# Patient Record
Sex: FEMALE | Race: BLACK OR AFRICAN AMERICAN | Employment: FULL TIME | URBAN - METROPOLITAN AREA
[De-identification: names, ages, dates, MRNs, and addresses within clinical notes are randomized per-mention and may not be internally consistent; named-entity substitution may affect disease eponyms.]

---

## 2019-11-19 ENCOUNTER — OFFICE VISIT (OUTPATIENT)
Dept: ORTHOPEDIC SURGERY | Age: 39
End: 2019-11-19

## 2019-11-19 VITALS
WEIGHT: 293 LBS | SYSTOLIC BLOOD PRESSURE: 154 MMHG | OXYGEN SATURATION: 94 % | RESPIRATION RATE: 10 BRPM | HEIGHT: 61 IN | HEART RATE: 79 BPM | BODY MASS INDEX: 55.32 KG/M2 | TEMPERATURE: 96.4 F | DIASTOLIC BLOOD PRESSURE: 101 MMHG

## 2019-11-19 DIAGNOSIS — S92.124A CLOSED NONDISPLACED FRACTURE OF BODY OF RIGHT TALUS, INITIAL ENCOUNTER: ICD-10-CM

## 2019-11-19 DIAGNOSIS — S93.421A SPRAIN OF DELTOID LIGAMENT OF RIGHT ANKLE, INITIAL ENCOUNTER: Primary | ICD-10-CM

## 2019-11-19 DIAGNOSIS — M25.572 CHRONIC PAIN OF LEFT ANKLE: ICD-10-CM

## 2019-11-19 DIAGNOSIS — M72.2 PLANTAR FASCIITIS, BILATERAL: ICD-10-CM

## 2019-11-19 DIAGNOSIS — M25.571 ACUTE RIGHT ANKLE PAIN: ICD-10-CM

## 2019-11-19 DIAGNOSIS — G89.29 CHRONIC PAIN OF LEFT ANKLE: ICD-10-CM

## 2019-11-19 NOTE — LETTER
NOTIFICATION RETURN TO WORK / SCHOOL 
 
11/19/2019 9:09 AM 
 
Ms. Joanna Macario [address] [facility] [id number] To Whom It May Concern: 
 
Joanna Macario is currently under the care of [address] [place] [address] Enrrique Gutierrez. Please allow Ms. Donato Campbell to work a sedentary position until further notice. If there are questions or concerns please have the patient contact our office.  
 
 
 
Sincerely, 
 
 
Sandra Cruz MD

## 2019-11-19 NOTE — PROGRESS NOTES
1. Have you been to the ER, urgent care clinic since your last visit? Hospitalized since your last visit? No    2. Have you seen or consulted any other health care providers outside of the [address] [place] [address] since your last visit? Include any pap smears or colon screening.  No

## 2019-11-19 NOTE — PROGRESS NOTES
AMBULATORY PROGRESS NOTE      Patient: Padmini Leblanc             MRN: [id number]     SSN: [id number] Body mass index is 62.13 kg/m². YOB: 1980     AGE: 37 y.o. SEX: female    PCP: Duncan Patel MD     IMPRESSION/DIAGNOSIS AND TREATMENT PLAN     DIAGNOSES  1. Sprain of deltoid ligament of right ankle, initial encounter    2. Plantar fasciitis, bilateral    3. Acute right ankle pain    4. Chronic pain of left ankle    5. Closed nondisplaced fracture of body of right talus, initial encounter        Orders Placed This Encounter    AMB SUPPLY ORDER    [54874] Ankle Min 3V    [19427] Ankle Min 3V    CT ANKLE RT WO  CONT    REFERRAL TO PHYSICAL THERAPY    AL CLOSED RX TALUS FX      Padmini Leblanc understands her diagnoses and the proposed plan. Plan:    1) DME Order: Short right CAM walker boot. 2) CT scan without IV Contrast of the right ankle. 3) Referral to Physical Therapy. 4) Work Note: Please allow Ms. Ester Call to work a sedentary position until further notice. 5) Take OTC Ibuprofen or Tylenol. 6) Continue activity modification as directed. RTO - after CT    Her x-rays, three views of the left ankle, show that there are some mild osteoarthritic changes seen to the left ankle. An incidental bone spur is seen to the superior portion of the calcaneus and inferior portion of the calcaneus. Otherwise, I see no acute fracture, subluxation, or dislocation in these three views of the left ankle. Three views of the right ankle, AP, lateral, and oblique, show some mild osteoarthritic changes seen to the right ankle, possible early osteochondral defect seen to the medial talar dome. There is a lucency, linear line seen to the medial portion of the talus suggestive of a vertical shear, non-displaced fracture of the posterior process or medial process of this right talus in the AP view in my ankle film.   Otherwise, the lateral film on the right shows some mild osteoarthritic changes seen to the right ankle. No acute fracture to the calcaneus or the navicular. The fibula on the right appears to be of equal height to that compared to that on the left in terms of the length of the fibula, but I am concerned, as I said before, a possible vertical shear fracture to the medial portion of the talus, and this is where she has some tenderness to the medial deltoid region. So, a CT scan of this right ankle is necessary to reassess and define this area in more detail. HPI AND EXAMINATION     Imani Puente IS A 37 y.o. female who presents to my outpatient office complaining of right ankle pain and left foot pain. Ms. Tyrell Hernández reports that she has been experiencing pain in her left plantar foot for the past year. She notes that her left foot is very tender to the touch and sore. She has been to the [facility] where she was given orthotic inserts and has tried several different shoes, none of which have helped with her pain. She adds that she has also tried rolling her foot with a frozen water bottle, which has not helped. She adds that she also experiences similar pain in her right foot, but to a lesser extent. As it regards to her right ankle, Ms. Tyrell Hernández reports that she has been favoring her right foot due to her left plantar fascial pain. As such, this past Saturday, she fell down 6 steps when coming out of her house, causing her to injure her right ankle. She was seen at [facility] the next day where she was given an ankle brace. She reports that she has been experiencing pain to her medial and lateral right ankle since her injury. She describes the pain as sharp when she bears weight and throbbing when she is seated. Date of injury: 11/14/2019.      Visit Vitals  BP (!) 154/101   Pulse 79   Temp 96.4 °F (35.8 °C) (Oral)   Resp 10   Ht 5' 1\" (1.549 m)   Wt 328 lb 12.8 oz (149.1 kg)   SpO2 94%   BMI 62.13 kg/m²     Appearance: Alert, well appearing and pleasant patient who is in no distress, oriented to person, place/time, and who follows commands. Psychiatric: Affect and mood are appropriate. Cardiovascular/Peripheral Vascular: Normal Pulses to each hand and foot  Musculoskeletal:  LOCATION:  Right FOOT/ANKLE  Integumentary: No rashes, skin patches, wounds, or abrasions to the right or left legs       Warm and normal color. No regions of expressible drainage. Swelling to lateral Ankle mild present    Swelling to Medial Ankle mild present      Gait: antalgic      Tenderness: ATFL/CFL Anterolateral ankle ligaments tenderness is mild present   Anterolateral fibula and posterolateral ankle tenderness is present   Anterior Syndesmosis is not present    Medial deltoid ligament tenderness is present    Peroneal tendon sheath  present    4/5 Metatarsal base tenderness is not present     Midfoot tenderness is not present    Achilles tenderness is not present    Cuboid tenderness is not present     Proximal fibula tenderness: is not present               Plantar fascia tenderness is present      Motor Strength/Tone Exam: Normal to the toes with respect to extension/flexion      Sensory Exam:   Intact Normal Sensation to ankle/foot      Stability Testing: Peroneal tendon instability tests: not conducted due to tenderness              Stability of ankle and subtalar region not tested due to tenderness      ROM: Not tested at this time      Contractures: No Achilles or Gastrocnemius Contractures      Calf tenderness: Absent for calf or gastrocnemius muscle regions       Soft, supple, non tender, non taut lower extremity compartments       Alignment: Neutral Hindfoot  Wounds/Abrasions:   None present  Extremities:   No embolic phenomena to the toes or hands         No significant edema to the foot and or toes.         Lower extremities are warm and appear well perfused    DVT: No evidence of DVT seen on examination at this time      No calf swelling, no tenderness to calf muscles  Lymphatic:  No Evidence of Lymphedema  Vascular: Medial Border of Tibia Region: Edema is not present        Pulses: Dorsalis Pedis &  Posterior Tibial Pulses : Palpable yes        Varicosities Lower Limbs :  None    Neuro: Negative bilateral Straight leg raise (seated position)    See Musculoskeletal section for pertinent individual extremity examination    No abnormal hand/wrist, foot/ankle, or facial/neck tremors. ANKLE/FOOT left    Tenderness: Mild tenderness to the plantar fascia. Cutaneous: WNL. Joint Motion: Slight gastrocnemius contracture  Joint / Tendon Stability: No Ankle or Subtalar instability or joint laxity. No peroneal sublux ability or dislocation  Alignment: Forefoot, Midfoot, Hindfoot WNL. Neuro Motor/Sensory: NL/NL. Vascular: NL foot/ankle pulses. Lymphatics: No extremity lymphedema, No calf swelling, no tenderness to calf muscles. CHART REVIEW     Past Medical History:   Diagnosis Date    Arthritic-like pain     GERD (gastroesophageal reflux disease)     Hypertension     Morbid obesity with BMI of 50.0-59.9, adult (HCC)      Current Outpatient Medications   Medication Sig    ibuprofen (MOTRIN) 200 mg tablet Take 800 mg by mouth every six (6) hours as needed for Pain. Indications: pain    SITagliptin-metFORMIN (JANUMET) 50-1,000 mg per tablet Take 2 Tabs by mouth two (2) times daily (with meals). Indications: type 2 diabetes mellitus, presently not taking, has been off medication x 1 month     No current facility-administered medications for this visit.       Allergies   Allergen Reactions    Iodinated Contrast Media Nausea and Vomiting     Past Surgical History:   Procedure Laterality Date    HX  SECTION      x 1;  BTL    HX CHOLECYSTECTOMY  2011     Social History     Occupational History    Not on file   Tobacco Use    Smoking status: Never Smoker    Smokeless tobacco: Never Used   Substance and Sexual Activity    Alcohol use: No    Drug use: No    Sexual activity: Yes     Partners: Male     Family History   Problem Relation Age of Onset    Diabetes Father         REVIEW OF SYSTEMS : 11/19/2019  ALL BELOW ARE Negative except : SEE HPI      REVIEW OF SYSTEMS : Total of 12 systems reviewed as follows:            CONSTITUTIONAL: No weight loss. PSYCHOLOGICAL : No Feelings of anxiety, depression, agitation  EYES: No blurred vision and no eye discharge. NO eye pain, double vision  ENT: No nasal discharge. No ear pain. CARDIOVASCULAR: No chest pain and no diaphoresis. RESPIRATORY: No cough, no hemoptysis. GI: No vomiting, no diarrhea   : No urinary frequency and no dysuria. MUSCULOSKELETAL: see HPI  SKIN: No rashes. NEURO: Negative for : dizziness,weakness, headaches     Negative for : visual changes or confusion, or seizures,   ENDOCRINE: No polyphagia and no polydipsia. HEMATOLOGY: No bleeding tendencies. DIAGNOSTIC IMAGING        Please see above section of this report. I have personally reviewed the results of the above study. The interpretation of this study is my professional opinion. Written by Brad Saenz, as dictated by Dr. Kia Cornell. I, Dr. Kia Cornell, confirm that all documentation is accurate.

## 2019-11-19 NOTE — PROGRESS NOTES
Verbal order given by Dr. Keely Og to sign order for PT, CT and DME entered by [facility].

## 2019-11-29 ENCOUNTER — HOSPITAL ENCOUNTER (OUTPATIENT)
Dept: CT IMAGING | Age: 39
Discharge: HOME | End: 2019-11-29
Attending: ORTHOPAEDIC SURGERY
Payer: COMMERCIAL

## 2019-11-29 DIAGNOSIS — M25.571 ACUTE RIGHT ANKLE PAIN: ICD-10-CM

## 2019-11-29 PROCEDURE — 73700 CT LOWER EXTREMITY W/O DYE: CPT

## 2019-12-02 ENCOUNTER — TELEPHONE (OUTPATIENT)
Dept: ORTHOPEDIC SURGERY | Age: 39
End: 2019-12-02

## 2019-12-02 NOTE — TELEPHONE ENCOUNTER
Patient called to go over the CT results - she had a CT of right ankle 11/29/19. I offered her MAC's next opening and we scheduled her for a f/u 12/10/19. Patient advised she cant wait that long to go over the CT - she advised they ordered it to see if she needed to be put into a hard cast this week. I advised would send clinical a message to see if they want her worked in sooner.          [phone]

## 2019-12-06 NOTE — TELEPHONE ENCOUNTER
Reviewed patients CT with Dr. Quinton Acevedo. He states she just has a bad ankle sprain - no tears or fractures. Patient should follow up in 12/10/19 as scheduled.     Antonio Dao PA-C  12/6/2019  11:25 AM

## 2019-12-10 ENCOUNTER — OFFICE VISIT (OUTPATIENT)
Dept: ORTHOPEDIC SURGERY | Age: 39
End: 2019-12-10

## 2019-12-10 VITALS
HEART RATE: 70 BPM | OXYGEN SATURATION: 97 % | DIASTOLIC BLOOD PRESSURE: 72 MMHG | WEIGHT: 293 LBS | RESPIRATION RATE: 16 BRPM | BODY MASS INDEX: 55.32 KG/M2 | HEIGHT: 61 IN | SYSTOLIC BLOOD PRESSURE: 185 MMHG | TEMPERATURE: 97.9 F

## 2019-12-10 DIAGNOSIS — M95.8 OSTEOCHONDRAL DEFECT OF TALUS: ICD-10-CM

## 2019-12-10 DIAGNOSIS — S93.421A SPRAIN OF DELTOID LIGAMENT OF RIGHT ANKLE, INITIAL ENCOUNTER: ICD-10-CM

## 2019-12-10 DIAGNOSIS — S92.124A CLOSED NONDISPLACED FRACTURE OF BODY OF RIGHT TALUS, INITIAL ENCOUNTER: Primary | ICD-10-CM

## 2019-12-10 NOTE — PATIENT INSTRUCTIONS
Ankle Sprain: Rehab Exercises Introduction Here are some examples of exercises for you to try. The exercises may be suggested for a condition or for rehabilitation. Start each exercise slowly. Ease off the exercises if you start to have pain. You will be told when to start these exercises and which ones will work best for you. How to do the exercises \"Alphabet\" exercise 1. Trace the alphabet with your toe. This helps your ankle move in all directions. Side-to-side knee swing exercise 1. Sit in a chair with your foot flat on the floor. 2. Slowly move your knee from side to side. Keep your foot pressed flat. 3. Continue this exercise for 2 to 3 minutes. Towel curl 1. While sitting, place your foot on a towel on the floor. Scrunch the towel toward you with your toes. 2. Then use your toes to push the towel away from you. 3. To make this exercise more challenging you can put something on the other end of the towel. A can of soup is about the right weight for this. Towel stretch 1. Sit with your legs extended and knees straight. 2. Place a towel around your foot just under the toes. 3. Hold each end of the towel in each hand, with your hands above your knees. 4. Pull back with the towel so that your foot stretches toward you. 5. Hold the position for at least 15 to 30 seconds. 6. Repeat 2 to 4 times a session. Do up to 5 sessions a day. Ankle eversion exercise 1. Start by sitting with your foot flat on the floor. Push your foot outward against a wall or a piece of furniture that doesn't move. Hold for about 6 seconds, and relax. Repeat 8 to 12 times. 2. After you feel comfortable with this, try using rubber tubing looped around the outside of your feet for resistance. Push your foot out to the side against the tubing, and then count to 10 as you slowly bring your foot back to the middle. Repeat 8 to 12 times. Isometric opposition exercises 1. While sitting, put your feet together flat on the floor. 2. Press your injured foot inward against your other foot. Hold for about 6 seconds, and relax. Repeat 8 to 12 times. 3. Then place the heel of your other foot on top of the injured one. Push down with the top heel while trying to push up with your injured foot. Hold for about 6 seconds, and relax. Repeat 8 to 12 times. Resisted ankle inversion 1. Sit on the floor with your good leg crossed over your other leg. 2. Hold both ends of an exercise band and loop the band around the inside of your affected foot. Then press your other foot against the band. 3. Keeping your legs crossed, slowly push your affected foot against the band so that foot moves away from your other foot. Then slowly relax. 4. Repeat 8 to 12 times. Resisted ankle eversion 1. Sit on the floor with your legs straight. 2. Hold both ends of an exercise band and loop the band around the outside of your affected foot. Then press your other foot against the band. 3. Keeping your leg straight, slowly push your affected foot outward against the band and away from your other foot without letting your leg rotate. Then slowly relax. 4. Repeat 8 to 12 times. Resisted ankle dorsiflexion 1. Tie the ends of an exercise band together to form a loop. Attach one end of the loop to a secure object or shut a door on it to hold it in place. (Or you can have someone hold one end of the loop to provide resistance.) 2. While sitting on the floor or in a chair, loop the other end of the band over the top of your affected foot. 3. Keeping your knee and leg straight, slowly flex your foot to pull back on the exercise band, and then slowly relax. 4. Repeat 8 to 12 times. Single-leg balance 1. Stand on a flat surface with your arms stretched out to your sides like you are making the letter \"T. \" Then lift your good leg off the floor, bending it at the knee. If you are not steady on your feet, use one hand to hold on to a chair, counter, or wall. 2. Standing on the leg with your affected ankle, keep that knee straight. Try to balance on that leg for up to 30 seconds. Then rest for up to 10 seconds. 3. Repeat 6 to 8 times. 4. When you can balance on your affected leg for 30 seconds with your eyes open, try to balance on it with your eyes closed. 5. When you can do this exercise with your eyes closed for 30 seconds and with ease and no pain, try standing on a pillow or piece of foam, and repeat steps 1 through 4. Follow-up care is a key part of your treatment and safety. Be sure to make and go to all appointments, and call your doctor if you are having problems. It's also a good idea to know your test results and keep a list of the medicines you take. Where can you learn more? Go to http://leigh ann-parmjit.info/. Jahaira Roy in the search box to learn more about \"Ankle Sprain: Rehab Exercises. \" Current as of: June 26, 2019 Content Version: 12.2 © 1436-0470 [employer]. Care instructions adapted under license by [employer] (which disclaims liability or warranty for this information). If you have questions about a medical condition or this instruction, always ask your healthcare professional. [address] any warranty or liability for your use of this information.

## 2019-12-10 NOTE — PROGRESS NOTES
1. Have you been to the ER, urgent care clinic since your last visit? Hospitalized since your last visit? No    2. Have you seen or consulted any other health care providers outside of the [address] [place] [address] since your last visit? Include any pap smears or colon screening.  No

## 2019-12-10 NOTE — PROGRESS NOTES
AMBULATORY PROGRESS NOTE      Patient: Haley Woods             MRN: [id number]     SSN: [id number] Body mass index is 61.98 kg/m². YOB: 1980     AGE: 37 y.o. SEX: female    PCP: Duncan Patel MD     IMPRESSION/DIAGNOSIS AND TREATMENT PLAN     DIAGNOSES  1. Closed nondisplaced fracture of body of right talus, initial encounter    2. Osteochondral defect of talus    3. Sprain of deltoid ligament of right ankle, initial encounter        Orders Placed This Encounter    Generic Supply Order      Haley Woods understands her diagnoses and the proposed plan. I explained to her the CT scan findings, and she understood them. She has an osteochondral defect to the medial talar dome and some bone fragments medially and laterally indicating an ankle sprain. She is doing better. On her initial x-ray, it showed a linear line seen to the posterior portion of the talus indicating a talar fracture, what looks like a minimally displaced fracture. With additional information on the CT scan, this confirms an osteochondral defect of the talus and some bony fragments but no displaced fracture through the body of the talus, but there is an osteochondral lesion and an osteochondral defect to the medial talar dome. Additional plans are listed as below. She is improving. She is not perfect yet but much improved. The plan is listed as below. Plan:    1) Continue activity modification as directed. 2) DME Order: Right AS/AC  3) HEP with [employer]  4) Wean CAM Walker and begin to use AS/AC  5) Work Note: Please allow Ms. Ester Call to continue to work a sedentary position. Anticipate this to remain in effect until January 13, 2020. She will be re-evaluated January 7, 2020. RTO - 1 month       HPI AND EXAMINATION     Haley Woods IS A 37 y.o. female who presents to my outpatient office for f/u of right talus fracture and ankle sprain and left foot pain.  At the last visit I provided a DME order for a short right CAM walker boot, referred the patient to physical therapy, provided a work note, ordered a CT of the right ankle, instructed the patient to take OTC Ibuprofen or Tylenol and to continue activity modification as directed. Date of injury: 11/14/2019. Since last visit, Ms. Eliz Enrique states her left foot is not bothering her as much as the right. As it regards to her right ankle, Ms. Eliz Enrique reports that her ankle continues to hurt. She denies much benefit with the boot because she 'pounds' her all day walking. She indicates that her foot hurts most at the end of the day. She notes she uses epsom salt baths with some benefit. Pt works at [employer]. Visit Vitals  /72 (BP 1 Location: Right arm, BP Patient Position: Sitting)   Pulse 70   Temp 97.9 °F (36.6 °C) (Oral)   Resp 16   Ht 5' 1\" (1.549 m)   Wt 328 lb (148.8 kg)   SpO2 97%   BMI 61.98 kg/m²     Appearance: Alert, well appearing and pleasant patient who is in no distress, oriented to person, place/time, and who follows commands. Psychiatric: Affect and mood are appropriate. Cardiovascular/Peripheral Vascular: Normal Pulses to each hand and foot  Musculoskeletal:  LOCATION:  Right FOOT/ANKLE  Integumentary: No rashes, skin patches, wounds, or abrasions to the right or left legs       Warm and normal color. No regions of expressible drainage.     Swelling to anterolateral Ankle mild present    Swelling to anteromedial Ankle mild present      Gait: antalgic      Tenderness: ATFL/CFL Anterolateral ankle ligaments tenderness is mild present   Anterolateral fibula and posterolateral ankle tenderness is not present    Anterior Syndesmosis is not present    Medial deltoid ligament tenderness is present   Peroneal tendon sheath  present    4/5 Metatarsal base tenderness is not present     Midfoot tenderness is not present    Achilles tenderness is not present    Cuboid tenderness is not present     Proximal fibula tenderness: is not present Plantar fascia tenderness is not present      Motor Strength/Tone Exam: Normal to the toes with respect to extension/flexion      Sensory Exam:   Intact Normal Sensation to ankle/foot      Stability Testing: Peroneal tendon instability tests: not conducted due to tenderness              Stability of ankle and subtalar region not tested due to tenderness      ROM: Not tested at this time      Contractures: No Achilles or Gastrocnemius Contractures      Calf tenderness: Absent for calf or gastrocnemius muscle regions       Soft, supple, non tender, non taut lower extremity compartments       Alignment: Neutral Hindfoot  Wounds/Abrasions:   None present  Extremities:   No embolic phenomena to the toes or hands         No significant edema to the foot and or toes. Lower extremities are warm and appear well perfused    DVT: No evidence of DVT seen on examination at this time      No calf swelling, no tenderness to calf muscles  Lymphatic:  No Evidence of Lymphedema  Vascular: Medial Border of Tibia Region: Edema is not present        Pulses: Dorsalis Pedis &  Posterior Tibial Pulses : Palpable yes        Varicosities Lower Limbs :  None    Neuro: Negative bilateral Straight leg raise (seated position)    See Musculoskeletal section for pertinent individual extremity examination    No abnormal hand/wrist, foot/ankle, or facial/neck tremors. CHART REVIEW     Past Medical History:   Diagnosis Date    Arthritic-like pain     GERD (gastroesophageal reflux disease)     Hypertension     Morbid obesity with BMI of 50.0-59.9, adult (HCC)      Current Outpatient Medications   Medication Sig    SITagliptin-metFORMIN (JANUMET) 50-1,000 mg per tablet Take 2 Tabs by mouth two (2) times daily (with meals). Indications: type 2 diabetes mellitus, presently not taking, has been off medication x 1 month    ibuprofen (MOTRIN) 200 mg tablet Take 800 mg by mouth every six (6) hours as needed for Pain.  Indications: pain No current facility-administered medications for this visit. Allergies   Allergen Reactions    Iodinated Contrast Media Nausea and Vomiting     Past Surgical History:   Procedure Laterality Date    HX  SECTION      x 1;  BTL    HX CHOLECYSTECTOMY  2011     Social History     Occupational History    Not on file   Tobacco Use    Smoking status: Never Smoker    Smokeless tobacco: Never Used   Substance and Sexual Activity    Alcohol use: No    Drug use: No    Sexual activity: Yes     Partners: Male     Family History   Problem Relation Age of Onset    Diabetes Father         REVIEW OF SYSTEMS : 12/10/2019  ALL BELOW ARE Negative except : SEE HPI      REVIEW OF SYSTEMS : Total of 12 systems reviewed as follows:            CONSTITUTIONAL: No weight loss. PSYCHOLOGICAL : No Feelings of anxiety, depression, agitation  EYES: No blurred vision and no eye discharge. NO eye pain, double vision  ENT: No nasal discharge. No ear pain. CARDIOVASCULAR: No chest pain and no diaphoresis. RESPIRATORY: No cough, no hemoptysis. GI: No vomiting, no diarrhea   : No urinary frequency and no dysuria. MUSCULOSKELETAL: see HPI  SKIN: No rashes. NEURO: Negative for : dizziness,weakness, headaches     Negative for : visual changes or confusion, or seizures,   ENDOCRINE: No polyphagia and no polydipsia. HEMATOLOGY: No bleeding tendencies. DIAGNOSTIC IMAGING     CT Results (most recent):  Results from Hospital Encounter encounter on 19   CT ANKLE RT WO  CONT    Narrative EXAM: CT ANKLE RT WO  CONT    CLINICAL INDICATION/HISTORY: right ankle pain/injury ; persistent symptoms since  a fall from a port for a few months ago; also with possible plantar fasciitis    TECHNIQUE: Contiguous 1.25 mm thickness axial images were obtained through the  right ankle. Sagittal and Coronal MPR generated.     Contrast Used: None    CT scans at this facility are performed using dose optimization technique as  appropriate with performed exam, to include automated exposure control,  adjustment of mA and/or kV according to patient's size (including appropriate  matching for site-specific examinations), or use of iterative reconstruction  technique. COMPARISON: None    FINDINGS: Grossly intact alignment of the right ankle. Subtly asymmetric  widening of the ankle mortise laterally. There is a 4 mm ovoid lucent focus  within the margin of the medial talar dome without fragmentation or collapse. There is a corticated 11 mm long curvilinear ossific body at the anterior medial  malleolus. There is a 3-formal metered corticated ossific body along the ventral distal  fibula with some adjacent hyperostosis. Small to moderate caliber spur of the Achilles insertion. Similarly at the  origin of the plantar fascia. Regional tendons are normally situated. No intrinsic finding of muscle bellies. No gross joint effusion, no soft tissue fluid collection. Impression IMPRESSION[de-identified]    1.  Corticated ossific bodies adjacent to the medial and lateral malleoli, which  can be sequelae of prior avulsion/ligamentous injury. 2. Subtle asymmetry in the ankle mortise could relate to laxity of ligamentous  structures. 3. Small subchondral cyst at the medial margin of the talar dome without  fragmentation or collapse. 4. Heel spurs. 5. Barring contraindication, MRI could yield greater soft tissue and marrow  space detail.

## 2019-12-10 NOTE — LETTER
NOTIFICATION RETURN TO WORK / SCHOOL 
 
12/10/2019 8:33 AM 
 
Ms. Srinath Treadwell [address] [facility] [address] To Whom It May Concern: 
 
Srinath Treadwell is currently under the care of [address] [place] [address] Enrrique Gutierrez. Please allow Ms. Juve Guido to continue to work a sedentary position. Anticipate this to remain in effect until January 13, 2020. She will be re-evaluated January 7, 2020. If there are questions or concerns please have the patient contact our office.  
 
 
 
Sincerely, 
 
 
 
Carmenza Betancourt MD

## 2021-04-27 PROBLEM — J12.82 PNEUMONIA DUE TO COVID-19 VIRUS: Status: ACTIVE | Noted: 2021-04-27

## 2021-04-27 PROBLEM — U07.1 PNEUMONIA DUE TO COVID-19 VIRUS: Status: ACTIVE | Noted: 2021-04-27

## 2021-04-27 PROBLEM — I10 ESSENTIAL HYPERTENSION: Status: ACTIVE | Noted: 2021-04-27

## 2021-04-27 PROBLEM — E11.9 DM2 (DIABETES MELLITUS, TYPE 2) (HCC): Status: ACTIVE | Noted: 2021-04-27

## 2021-04-27 PROBLEM — E66.01 MORBID OBESITY (HCC): Status: ACTIVE | Noted: 2021-04-27

## 2021-04-27 PROBLEM — J96.91 RESPIRATORY FAILURE WITH HYPOXIA (HCC): Status: ACTIVE | Noted: 2021-04-27

## 2021-08-03 PROBLEM — I10 HYPERTENSION: Status: RESOLVED | Noted: 2021-08-03 | Resolved: 2021-08-03

## 2022-03-18 PROBLEM — J96.91 RESPIRATORY FAILURE WITH HYPOXIA: Status: ACTIVE | Noted: 2021-04-27

## 2022-03-18 PROBLEM — E11.9 DM2 (DIABETES MELLITUS, TYPE 2) (HCC): Status: ACTIVE | Noted: 2021-04-27

## 2022-03-19 PROBLEM — I10 ESSENTIAL HYPERTENSION: Status: ACTIVE | Noted: 2021-04-27

## 2022-03-19 PROBLEM — E66.01 MORBID OBESITY: Status: ACTIVE | Noted: 2021-04-27

## 2022-03-20 PROBLEM — U07.1 PNEUMONIA DUE TO COVID-19 VIRUS: Status: ACTIVE | Noted: 2021-04-27

## 2022-03-20 PROBLEM — J12.82 PNEUMONIA DUE TO COVID-19 VIRUS: Status: ACTIVE | Noted: 2021-04-27

## 2025-01-16 ENCOUNTER — HOSPITAL ENCOUNTER (EMERGENCY)
Facility: HOSPITAL | Age: 45
Discharge: HOME | End: 2025-01-16
Payer: COMMERCIAL

## 2025-01-16 VITALS
DIASTOLIC BLOOD PRESSURE: 98 MMHG | OXYGEN SATURATION: 100 % | TEMPERATURE: 98.2 F | HEART RATE: 91 BPM | RESPIRATION RATE: 20 BRPM | SYSTOLIC BLOOD PRESSURE: 159 MMHG

## 2025-01-16 DIAGNOSIS — M79.645 PAIN OF FINGER OF LEFT HAND: ICD-10-CM

## 2025-01-16 DIAGNOSIS — L08.9: ICD-10-CM

## 2025-01-16 DIAGNOSIS — L03.012 PARONYCHIA OF FINGER OF LEFT HAND: Primary | ICD-10-CM

## 2025-01-16 PROCEDURE — 26010 DRAINAGE OF FINGER ABSCESS: CPT

## 2025-01-16 PROCEDURE — 99283 EMERGENCY DEPT VISIT LOW MDM: CPT

## 2025-01-16 RX ORDER — SULFAMETHOXAZOLE AND TRIMETHOPRIM 800; 160 MG/1; MG/1
1 TABLET ORAL 2 TIMES DAILY
Qty: 14 TABLET | Refills: 0 | Status: SHIPPED | OUTPATIENT
Start: 2025-01-16 | End: 2025-01-23

## 2025-01-16 RX ORDER — LIDOCAINE 50 MG/G
OINTMENT TOPICAL
Qty: 30 G | Refills: 0 | Status: SHIPPED | OUTPATIENT
Start: 2025-01-16

## 2025-01-16 RX ORDER — LIDOCAINE 50 MG/G
OINTMENT TOPICAL
COMMUNITY
End: 2025-01-16

## 2025-01-16 RX ORDER — NAPROXEN SODIUM 550 MG/1
550 TABLET ORAL 2 TIMES DAILY WITH MEALS
Qty: 30 TABLET | Refills: 0 | Status: SHIPPED | OUTPATIENT
Start: 2025-01-16

## 2025-01-16 ASSESSMENT — ENCOUNTER SYMPTOMS: EYES NEGATIVE: 1

## 2025-01-16 NOTE — DISCHARGE INSTRUCTIONS
Apply heat to area 3-5 times a day for 5 to 10 minutes at a time.  Take medication as prescribed. Follow-up with your primary care physician within 2 days for reassessment. Bring the results from this visit with you for their review. Return to the ED immediately for any new, worsening, or persistent symptoms, including fever, vomiting, chest pain, shortness of breath, or any other medical concerns.

## 2025-01-16 NOTE — ED PROVIDER NOTES
[facility] EMERGENCY DEPT  EMERGENCY DEPARTMENT HISTORY AND PHYSICAL EXAM      Date: 1/16/2025  Patient Name: Hawa Raymond  MRN: [id number]  YOB: 1980  Date of evaluation: 1/16/2025  Provider: JOAN Fontanez NP   Note Started: 5:05 PM EST 1/16/25      History of Presenting Illness     Chief Complaint   Patient presents with    Wound Infection    Finger Pain    Hypertension         History Provided By: Patient and medical chart review.    Additional History (Context): Hawa Raymond is a 44 y.o. female with   Past Medical History:   Diagnosis Date    Arthritic-like pain     Diabetes mellitus (HCC)     GERD (gastroesophageal reflux disease)     Hypertension     Morbid obesity with BMI of 50.0-59.9, adult    who presents with complaints of left finger pain and swelling.  Patient denies any injury or trauma. States has been going on about 2 weeks. States [facility] and [facility] told her to keep putting heat on the area and soak with epsom salt take Motrin Tylenol will give her some antibiotics but does not recall the name.  States she is taking it for about 3 days has not shown any improvement.  Patient denies any fever or chills.  Denies any drainage.  States is just painful and swollen.      PCP: Imer Garcia MD    No current facility-administered medications for this encounter.     Current Outpatient Medications   Medication Sig Dispense Refill    lidocaine (XYLOCAINE) 5 % ointment Apply topically as needed. 30 g 0    naproxen sodium (ANAPROX) 550 MG tablet Take 1 tablet by mouth 2 times daily (with meals) 30 tablet 0    sulfamethoxazole-trimethoprim (BACTRIM DS;SEPTRA DS) 800-160 MG per tablet Take 1 tablet by mouth 2 times daily for 7 days 14 tablet 0    acetaminophen (TYLENOL) 325 MG tablet Take 325 mg by mouth every 4 hours as needed      hydroCHLOROthiazide (HYDRODIURIL) 25 MG tablet Take 25 mg by mouth daily      lisinopril (PRINIVIL;ZESTRIL) 20 MG tablet Take 20 mg by mouth

## 2025-01-16 NOTE — ED TRIAGE NOTES
Patient states that having left finger pain and swelling for the past 2 weeks. Seen at urgent care and started on abx. Patient has hx of HTN, took lisinopril today